# Patient Record
Sex: FEMALE | Race: WHITE | NOT HISPANIC OR LATINO | Employment: OTHER | URBAN - METROPOLITAN AREA
[De-identification: names, ages, dates, MRNs, and addresses within clinical notes are randomized per-mention and may not be internally consistent; named-entity substitution may affect disease eponyms.]

---

## 2024-08-11 ENCOUNTER — HOSPITAL ENCOUNTER (EMERGENCY)
Facility: CLINIC | Age: 39
Discharge: HOME | End: 2024-08-11
Attending: PHYSICIAN ASSISTANT | Admitting: PHYSICIAN ASSISTANT

## 2024-08-11 VITALS
DIASTOLIC BLOOD PRESSURE: 87 MMHG | BODY MASS INDEX: 23.16 KG/M2 | TEMPERATURE: 98.5 F | RESPIRATION RATE: 15 BRPM | OXYGEN SATURATION: 100 % | WEIGHT: 139 LBS | HEIGHT: 65 IN | HEART RATE: 57 BPM | SYSTOLIC BLOOD PRESSURE: 133 MMHG

## 2024-08-11 DIAGNOSIS — H57.11 ACUTE RIGHT EYE PAIN: ICD-10-CM

## 2024-08-11 LAB
ANION GAP SERPL CALCULATED.3IONS-SCNC: 15 MMOL/L (ref 7–15)
BASOPHILS # BLD AUTO: 0 10E3/UL (ref 0–0.2)
BASOPHILS NFR BLD AUTO: 1 %
BUN SERPL-MCNC: 6.9 MG/DL (ref 6–20)
CALCIUM SERPL-MCNC: 10.2 MG/DL (ref 8.8–10.4)
CHLORIDE SERPL-SCNC: 101 MMOL/L (ref 98–107)
CREAT SERPL-MCNC: 0.72 MG/DL (ref 0.51–0.95)
CRP SERPL-MCNC: <3 MG/L
EGFRCR SERPLBLD CKD-EPI 2021: >90 ML/MIN/1.73M2
EOSINOPHIL # BLD AUTO: 0.1 10E3/UL (ref 0–0.7)
EOSINOPHIL NFR BLD AUTO: 2 %
ERYTHROCYTE [DISTWIDTH] IN BLOOD BY AUTOMATED COUNT: 12.4 % (ref 10–15)
GLUCOSE SERPL-MCNC: 70 MG/DL (ref 70–99)
HCG SERPL QL: NEGATIVE
HCO3 SERPL-SCNC: 25 MMOL/L (ref 22–29)
HCT VFR BLD AUTO: 43.7 % (ref 35–47)
HGB BLD-MCNC: 15.3 G/DL (ref 11.7–15.7)
IMM GRANULOCYTES # BLD: 0 10E3/UL
IMM GRANULOCYTES NFR BLD: 0 %
LYMPHOCYTES # BLD AUTO: 1.9 10E3/UL (ref 0.8–5.3)
LYMPHOCYTES NFR BLD AUTO: 37 %
MCH RBC QN AUTO: 31.3 PG (ref 26.5–33)
MCHC RBC AUTO-ENTMCNC: 35 G/DL (ref 31.5–36.5)
MCV RBC AUTO: 89 FL (ref 78–100)
MONOCYTES # BLD AUTO: 0.3 10E3/UL (ref 0–1.3)
MONOCYTES NFR BLD AUTO: 6 %
NEUTROPHILS # BLD AUTO: 2.7 10E3/UL (ref 1.6–8.3)
NEUTROPHILS NFR BLD AUTO: 54 %
NRBC # BLD AUTO: 0 10E3/UL
NRBC BLD AUTO-RTO: 0 /100
PLATELET # BLD AUTO: 161 10E3/UL (ref 150–450)
POTASSIUM SERPL-SCNC: 3.7 MMOL/L (ref 3.4–5.3)
RBC # BLD AUTO: 4.89 10E6/UL (ref 3.8–5.2)
SODIUM SERPL-SCNC: 141 MMOL/L (ref 135–145)
WBC # BLD AUTO: 5 10E3/UL (ref 4–11)

## 2024-08-11 PROCEDURE — 96361 HYDRATE IV INFUSION ADD-ON: CPT

## 2024-08-11 PROCEDURE — 99284 EMERGENCY DEPT VISIT MOD MDM: CPT | Mod: 25

## 2024-08-11 PROCEDURE — 84703 CHORIONIC GONADOTROPIN ASSAY: CPT | Performed by: PHYSICIAN ASSISTANT

## 2024-08-11 PROCEDURE — 80048 BASIC METABOLIC PNL TOTAL CA: CPT | Performed by: PHYSICIAN ASSISTANT

## 2024-08-11 PROCEDURE — 96375 TX/PRO/DX INJ NEW DRUG ADDON: CPT

## 2024-08-11 PROCEDURE — 96374 THER/PROPH/DIAG INJ IV PUSH: CPT

## 2024-08-11 PROCEDURE — 85025 COMPLETE CBC W/AUTO DIFF WBC: CPT | Performed by: PHYSICIAN ASSISTANT

## 2024-08-11 PROCEDURE — 36415 COLL VENOUS BLD VENIPUNCTURE: CPT | Performed by: PHYSICIAN ASSISTANT

## 2024-08-11 PROCEDURE — 86140 C-REACTIVE PROTEIN: CPT | Performed by: PHYSICIAN ASSISTANT

## 2024-08-11 PROCEDURE — 250N000009 HC RX 250: Performed by: PHYSICIAN ASSISTANT

## 2024-08-11 PROCEDURE — 250N000011 HC RX IP 250 OP 636: Performed by: PHYSICIAN ASSISTANT

## 2024-08-11 PROCEDURE — 258N000003 HC RX IP 258 OP 636: Performed by: PHYSICIAN ASSISTANT

## 2024-08-11 RX ORDER — KETOROLAC TROMETHAMINE 15 MG/ML
15 INJECTION, SOLUTION INTRAMUSCULAR; INTRAVENOUS ONCE
Status: COMPLETED | OUTPATIENT
Start: 2024-08-11 | End: 2024-08-11

## 2024-08-11 RX ORDER — DIPHENHYDRAMINE HYDROCHLORIDE 50 MG/ML
25 INJECTION INTRAMUSCULAR; INTRAVENOUS ONCE
Status: COMPLETED | OUTPATIENT
Start: 2024-08-11 | End: 2024-08-11

## 2024-08-11 RX ORDER — PROPARACAINE HYDROCHLORIDE 5 MG/ML
1 SOLUTION/ DROPS OPHTHALMIC ONCE
Status: COMPLETED | OUTPATIENT
Start: 2024-08-11 | End: 2024-08-11

## 2024-08-11 RX ADMIN — FLUORESCEIN SODIUM 1 STRIP: 1 STRIP OPHTHALMIC at 17:56

## 2024-08-11 RX ADMIN — PROPARACAINE HYDROCHLORIDE 1 DROP: 5 SOLUTION/ DROPS OPHTHALMIC at 17:56

## 2024-08-11 RX ADMIN — DIPHENHYDRAMINE HYDROCHLORIDE 25 MG: 50 INJECTION, SOLUTION INTRAMUSCULAR; INTRAVENOUS at 17:59

## 2024-08-11 RX ADMIN — SODIUM CHLORIDE 1000 ML: 9 INJECTION, SOLUTION INTRAVENOUS at 17:56

## 2024-08-11 RX ADMIN — KETOROLAC TROMETHAMINE 15 MG: 15 INJECTION, SOLUTION INTRAMUSCULAR; INTRAVENOUS at 17:58

## 2024-08-11 ASSESSMENT — VISUAL ACUITY
OD: 20/50
OS: 20/25
OU: 20/25

## 2024-08-11 ASSESSMENT — ACTIVITIES OF DAILY LIVING (ADL)
ADLS_ACUITY_SCORE: 35
ADLS_ACUITY_SCORE: 35

## 2024-08-11 NOTE — DISCHARGE INSTRUCTIONS
Your labs and exam are reassuring. Follow-up with ophthalmology for recheck in 1-3 days. Return to ED with any new or worsening symptoms such as eye swelling, fevers, or loss of vision.

## 2024-08-11 NOTE — ED TRIAGE NOTES
Pt c/o eye pain starting this morning, no trauma to eye or other explanation for pain, blurry vision noted per pt in R eye, visual acuity done, UC recommended pt come to ED for further eval, ABC intact.       Triage Assessment (Adult)       Row Name 08/11/24 7858          Triage Assessment    Airway WDL WDL        Respiratory WDL    Respiratory WDL WDL        Skin Circulation/Temperature WDL    Skin Circulation/Temperature WDL WDL        Cardiac WDL    Cardiac WDL WDL        Peripheral/Neurovascular WDL    Peripheral Neurovascular WDL WDL        Cognitive/Neuro/Behavioral WDL    Cognitive/Neuro/Behavioral WDL WDL

## 2024-08-11 NOTE — ED PROVIDER NOTES
"  Emergency Department Note      History of Present Illness     Chief Complaint   Eye Pain      HPI   Jennifer Johns is a 39 year old female who presents to the ED with eye pain. She explains that she woke this morning with pain in her right eye and some watery discharge. She explains that this pain is worsened by moving the eye to look at things. She further endorses photophobia and blurred vision stating she can only see the outlines of things in the room. She denies any recent trauma or injury to the face/eye, no preceding symptoms yesterday. She endorses a history of migraine though states she usually does have eye pain or symptoms. She presented first to urgent care who performed an eye examination, she was referred to the ED for further evaluation afterward. She denies any history of similar eye issues. Denies possibility of pregnancy.    Independent Historian   None    Review of External Notes   None    Past Medical History     Medical History and Problem List   No past medical history on file.    Medications   No current outpatient medications on file.      Surgical History   No past surgical history on file.    Physical Exam     Patient Vitals for the past 24 hrs:   BP Temp Temp src Pulse Resp SpO2 Height Weight   08/11/24 1839 133/87 -- -- 57 15 100 % -- --   08/11/24 1724 -- -- -- -- -- -- 1.651 m (5' 5\") 63 kg (139 lb)   08/11/24 1636 (!) 140/95 98.5  F (36.9  C) Temporal 79 16 100 % -- --     Physical Exam  Constitutional: Alert, attentive, GCS 15  HENT:    Nose: Nose normal.    Mouth/Throat: Oropharynx is clear, mucous membranes are moist   Eyes: EOM are normal. No periorbital eye swelling. No pain with extraocular eye movements. No discharge.   - normal sclera and conjunctiva  - EOMI  - No foreign body with eversion of the lids  - No focal fluorescein uptake to suggest abrasion or keratitis, and no Bola sign  - No Cell or flare  - No ciliary flush  - No hypopion or hyphema  - Right eye pressures " were 17,13,14 mmHg  Visual acuity: Left eye 20/25  Right eye: 20/50  No right or left temporal arterial tenderness. See picture below:  CV: regular rate and rhythm; no murmurs, rubs or gallups  Chest: Effort normal and breath sounds normal.   MSK: Normal range of motion.   Neurological: Alert, attentive  Skin: Skin is warm and dry.      Diagnostics     Lab Results   Labs Ordered and Resulted from Time of ED Arrival to Time of ED Departure   BASIC METABOLIC PANEL - Normal       Result Value    Sodium 141      Potassium 3.7      Chloride 101      Carbon Dioxide (CO2) 25      Anion Gap 15      Urea Nitrogen 6.9      Creatinine 0.72      GFR Estimate >90      Calcium 10.2      Glucose 70     HCG QUALITATIVE PREGNANCY - Normal    hCG Serum Qualitative Negative     CRP INFLAMMATION - Normal    CRP Inflammation <3.00     CBC WITH PLATELETS AND DIFFERENTIAL    WBC Count 5.0      RBC Count 4.89      Hemoglobin 15.3      Hematocrit 43.7      MCV 89      MCH 31.3      MCHC 35.0      RDW 12.4      Platelet Count 161      % Neutrophils 54      % Lymphocytes 37      % Monocytes 6      % Eosinophils 2      % Basophils 1      % Immature Granulocytes 0      NRBCs per 100 WBC 0      Absolute Neutrophils 2.7      Absolute Lymphocytes 1.9      Absolute Monocytes 0.3      Absolute Eosinophils 0.1      Absolute Basophils 0.0      Absolute Immature Granulocytes 0.0      Absolute NRBCs 0.0         Imaging   No orders to display     EKG   None    Independent Interpretation   None    ED Course      Medications Administered   Medications   proparacaine (ALCAINE) 0.5 % ophthalmic solution 1 drop (1 drop Right Eye $Given 8/11/24 1756)   fluorescein (FUL-CORAL) ophthalmic strip 1 strip (1 strip Right Eye $Given 8/11/24 1756)   ketorolac (TORADOL) injection 15 mg (15 mg Intravenous $Given 8/11/24 1758)   diphenhydrAMINE (BENADRYL) injection 25 mg (25 mg Intravenous $Given 8/11/24 1759)   sodium chloride 0.9% BOLUS 1,000 mL (0 mLs Intravenous  Stopped 8/11/24 1832)       Procedures   Procedures     Discussion of Management   None    ED Course   ED Course as of 08/11/24 1926   Sun Aug 11, 2024   1702 I obtained history and examined the patient as noted above.   1727 I rechecked the patient and performed an eye examination.   1825 I rechecked the patient and explained findings.       Additional Documentation  None    Medical Decision Making / Diagnosis     CMS Diagnoses: None    MIPS       None    MDM   Jennifer Johns is a 39 year old female who presents with right eye pain that started when she woke up today.  She is afebrile, normotensive, nonhypoxic.  Physical examination reveals a normal-appearing eye with pupils equal and reactive. There is a small pimple on the right eyebrow but no significant swelling, warmth, or erythema. There are no neurological deficits and cranial nerves are normal.  There is no evidence of periorbital swelling, pain with extraocular eye movements, discharge or matter, foreign body, or trauma to eye.  She reports blurred vision but no vision loss, curtains, visual cuts, or floaters.  Visual acuity is worse in right eye at 20/50 with left eye being 20/25.  Slit-lamp examination reveals no evidence of hyphema, hypopyon, Bola sign, and anterior chamber remains quiet.  No evidence of conjunctival injection or ciliary flush.  There is no foreign bodies within the eye or under the upper or lower eyelids. No fluorescein uptake to suggest cornea abrasion or ulcerative lesions.  Labs today are reassuring without leukocytosis and CRP is normal.  Temporal arteritis is unlikely given lack of risk factors and reassuring labs today.  Patient also reports no floaters or vision cuts and retinal detachment is unlikely.  There is also no signs of infection such as conjunctivitis, iritis, keratitis, orbital or periorbital cellulitis. Doubt CRAO.  Patient does have occasional migraine and was treated with migraine cocktail with improvement of  symptoms.  Suspect patient may have an ocular migraine however recommend she follow-up with her eye specialist for recheck within the next 1 to 2 days.  Patient also has follow-up with neurology for other concerns. Return precautions to ED discussed including fevers, worsening pain, vision changes, nausea, vomiting, or dizziness.  Patient is comfortable with plan and was discharged home.    Disposition   The patient was discharged.     Diagnosis     ICD-10-CM    1. Acute right eye pain  H57.11            Discharge Medications   There are no discharge medications for this patient.          IRUDOLPH, am serving as a scribe at 4:49 PM on 8/11/2024 to document services personally performed by Paz Mathis PA-C based on my observations and the provider's statements to me.      Paz Mathis PA-C  08/11/24 1929

## 2024-10-06 ENCOUNTER — HEALTH MAINTENANCE LETTER (OUTPATIENT)
Age: 39
End: 2024-10-06